# Patient Record
Sex: FEMALE | Race: WHITE | NOT HISPANIC OR LATINO
[De-identification: names, ages, dates, MRNs, and addresses within clinical notes are randomized per-mention and may not be internally consistent; named-entity substitution may affect disease eponyms.]

---

## 2018-05-01 PROBLEM — Z00.00 ENCOUNTER FOR PREVENTIVE HEALTH EXAMINATION: Status: ACTIVE | Noted: 2018-05-01

## 2018-05-28 ENCOUNTER — APPOINTMENT (OUTPATIENT)
Dept: OPHTHALMOLOGY | Facility: CLINIC | Age: 43
End: 2018-05-28

## 2020-03-18 ENCOUNTER — EMERGENCY (EMERGENCY)
Facility: HOSPITAL | Age: 45
LOS: 0 days | Discharge: HOME | End: 2020-03-18
Attending: EMERGENCY MEDICINE | Admitting: EMERGENCY MEDICINE
Payer: COMMERCIAL

## 2020-03-18 VITALS
SYSTOLIC BLOOD PRESSURE: 167 MMHG | OXYGEN SATURATION: 100 % | HEART RATE: 100 BPM | RESPIRATION RATE: 18 BRPM | DIASTOLIC BLOOD PRESSURE: 87 MMHG | TEMPERATURE: 99 F

## 2020-03-18 VITALS
DIASTOLIC BLOOD PRESSURE: 80 MMHG | OXYGEN SATURATION: 100 % | SYSTOLIC BLOOD PRESSURE: 147 MMHG | RESPIRATION RATE: 18 BRPM | TEMPERATURE: 99 F | HEART RATE: 99 BPM

## 2020-03-18 DIAGNOSIS — M79.676 PAIN IN UNSPECIFIED TOE(S): ICD-10-CM

## 2020-03-18 DIAGNOSIS — L03.032 CELLULITIS OF LEFT TOE: ICD-10-CM

## 2020-03-18 DIAGNOSIS — R21 RASH AND OTHER NONSPECIFIC SKIN ERUPTION: ICD-10-CM

## 2020-03-18 DIAGNOSIS — L02.416 CUTANEOUS ABSCESS OF LEFT LOWER LIMB: ICD-10-CM

## 2020-03-18 PROCEDURE — 99283 EMERGENCY DEPT VISIT LOW MDM: CPT | Mod: 25

## 2020-03-18 PROCEDURE — 10060 I&D ABSCESS SIMPLE/SINGLE: CPT

## 2020-03-18 NOTE — ED PROVIDER NOTE - ATTENDING CONTRIBUTION TO CARE
45yF otherwise healthy p/w toe pain x 2d - reports pain to L 5th toe since yesterday.  Noticed that lateral L 5th toe was red and swollen and went to urgent care yesterday, who started her on clindamycin.  She has taken 4 doses so far (2 yesterday, 2 so far today) and 45yF otherwise healthy p/w toe pain x 2d - reports pain to L 5th toe since yesterday.  Noticed that lateral L 5th toe was red and swollen and went to urgent care yesterday, who started her on clindamycin.  She has taken 4 doses so far (2 yesterday, 2 so far today), but toe is more red/swollen/painful today.    well appearing woman in NAD  no clinical concern for sepsis  no red streaking or swelling to foot beyond L 5th toe, but L toe itself is erythematous, swollen and w/ ?fluctuance

## 2020-03-18 NOTE — ED PROVIDER NOTE - CARE PLAN
Principal Discharge DX:	Cellulitis and abscess Principal Discharge DX:	Cellulitis and abscess of toe of left foot

## 2020-03-18 NOTE — ED PROVIDER NOTE - NSFOLLOWUPINSTRUCTIONS_ED_ALL_ED_FT
Abscess    An abscess is an infected area that contains a collection of pus and debris. It can occur in almost any part of the body and occurs when the tissue gets infection. Symptoms include a painful mass that is red, warm, tender that might break open and HAVE drainage. If your health care provider gave you antibiotics make sure to take the full course and do not stop even if feeling better.     SEEK IMMEDIATE MEDICAL CARE IF YOU HAVE ANY OF THE FOLLOWING SYMPTOMS: chills, fever, muscle aches, or red streaking from the area.     Cellulitis    Cellulitis is a skin infection caused by bacteria. This condition occurs most often in the arms and lower legs but can occur anywhere over the body. Symptoms include redness, swelling, warm skin, tenderness, and chills/fever. If you were prescribed an antibiotic medicine, take it as told by your health care provider. Do not stop taking the antibiotic even if you start to feel better.    SEEK IMMEDIATE MEDICAL CARE IF YOU HAVE ANY OF THE FOLLOWING SYMPTOMS: worsening fever, red streaks coming from affected area, vomiting or diarrhea, or dizziness/lightheadedness.

## 2020-03-18 NOTE — ED ADULT NURSE NOTE - OBJECTIVE STATEMENT
pt presents with left 5th toe pain and swelling x 5 days with red and swelling noted yesterday. pt was seen by urgent care and started on Clindamycin yesterday. pt states , she went to follow up in urgent care today and was told to go to the ED. pt reports no change in condition from yesterday to today. pt denies fever at  home,

## 2020-03-18 NOTE — ED PROVIDER NOTE - NS ED ROS FT
Constitutional: (-) fever, (-) chills  Musculoskeletal:(-) joint pain,  Integumentary: (+) rash, (-) edema, (-) bruises  Neurological:  (-) tingling, (-)numbness,  Allergic/Immunologic: (-) pruritus

## 2020-03-18 NOTE — ED PROVIDER NOTE - PATIENT PORTAL LINK FT
You can access the FollowMyHealth Patient Portal offered by [facility] by registering at the following website: [email or web address]. By joining [email or web address]’s FollowMyHealth portal, you will also be able to view your health information using other applications (apps) compatible with our system.

## 2020-03-18 NOTE — ED PROVIDER NOTE - OBJECTIVE STATEMENT
46 yo female, no pmh, presents to ed for abscess to left foot, started a few days ago, mild, aching, no radiation, on one day of clinda. Denies fever, chills, numbness, tingling; admits to rash.

## 2020-03-18 NOTE — ED PROVIDER NOTE - PHYSICAL EXAMINATION
Physical Exam    Vital Signs: I have reviewed the initial vital signs.  Constitutional: well-nourished, appears stated age, no acute distress  Eyes: Conjunctiva pink, Sclera clear  Musculoskeletal: from of accected joint  Integumentary: abscess to left lateral foot, surrounding cellulitis, no streaking,   Neurologic: awake, alert, nvi

## 2020-03-18 NOTE — ED PROVIDER NOTE - CLINICAL SUMMARY MEDICAL DECISION MAKING FREE TEXT BOX
45yF p/w worsening cellulitis to L 5th toe and developing abscess.  Bedside I&D performed.  Recommend continued abx, home wound care, f/u o/p, return precautions.